# Patient Record
Sex: FEMALE | Race: WHITE | NOT HISPANIC OR LATINO | Employment: PART TIME | URBAN - METROPOLITAN AREA
[De-identification: names, ages, dates, MRNs, and addresses within clinical notes are randomized per-mention and may not be internally consistent; named-entity substitution may affect disease eponyms.]

---

## 2017-02-14 ENCOUNTER — TELEPHONE (OUTPATIENT)
Dept: FAMILY MEDICINE | Facility: CLINIC | Age: 52
End: 2017-02-14

## 2017-02-14 DIAGNOSIS — J01.01 ACUTE RECURRENT MAXILLARY SINUSITIS: Primary | ICD-10-CM

## 2017-02-14 NOTE — TELEPHONE ENCOUNTER
RN Sinusitis Treatment Protocol: ages 16 and up  Jennifer Hernandez, a 51 year old female, is having symptoms reviewed for possible sinus infection.    ASSESSMENT/PLAN:  1.  Antibiotic treatment is indicated as onset of symptoms have been more than 10 days.  Amoxicillin-clavulanate 875mg/125mg orally twice daily or 500mg/125mg three times daily for 5 to 7 days.  2.  Education: ibuprofen or acetaminophen as directed on the bottle, over the counter decongestant, Nasal saline rinse, (Neti-pot or a nasal saline spray is preferred to Afrin nasal spray), Afrin nasal spray no longer than 5 days.  3.  Follow-up: Schedule an appointment with a provider if symptoms do not improve after 3 days of antibiotics or if symptoms return after antibiotic therapy is complete.   4.  Patient verbalized understanding of this plan and is agreeable.    SUBJECTIVE:  Symptoms: Facial pain or pressure over the sinus areas, especially if worse with position change or cough, Nasal discharge/purulent, Nasal congestion and Change in sense of smell or lack of smell.     In addition notes: None   Shortness of breath: NO  Onset of symptoms was 12 day(s) ago.    Treatment measures tried include Fluids, OTC meds, Rest and Vaporizer.   Course of illness is worsening.  Predisposing conditions include: History of chronic sinusitis and Seasonal allergies    Complicating Factors and Serious Symptoms:   Patient reports: NONE   Patient denies: Fever > 102.5  Orbital pain  Periorbital swelling or erythema  Severe facial swelling or erythema  Severe neck pain  This being the worse headache the patient has ever experienced  Vision changes  COPD (chronic obstructive pulmonary disease)    ALLERGIES:  Allergies   Allergen Reactions     Doxycycline Swelling     Face and eye swelling     Hmg-Coa-R Inhibitors      Lipitor     Morphine Nausea and Vomiting     OBJECTIVE:  Weight: 0 lbs 0 oz    Encounter handled by: Nurse Triage.     Jenny Taylor RN

## 2017-02-14 NOTE — TELEPHONE ENCOUNTER
Reason for call:  Patient reporting a symptom    Symptom or request: sinus infection    Duration (how long have symptoms been present): 12 days    Have you been treated for this before? No    Additional comments: pt calling stating she has a sinus infection that won't go away. She has tried several otc products but they don't seem to be helping. She is wondering about getting a prescription called in.    Phone Number patient can be reached at:  Cell number on file:    Telephone Information:   Mobile [phone]       Best Time:  any    Can we leave a detailed message on this number:  YES    Call taken on 2/14/2017 at 9:30 AM by Michelle Fuller

## 2017-02-15 ENCOUNTER — OFFICE VISIT (OUTPATIENT)
Dept: FAMILY MEDICINE | Facility: CLINIC | Age: 52
End: 2017-02-15
Payer: COMMERCIAL

## 2017-02-15 VITALS
OXYGEN SATURATION: 96 % | BODY MASS INDEX: 20.24 KG/M2 | TEMPERATURE: 98.2 F | WEIGHT: 110 LBS | SYSTOLIC BLOOD PRESSURE: 125 MMHG | HEART RATE: 88 BPM | DIASTOLIC BLOOD PRESSURE: 60 MMHG | HEIGHT: 62 IN

## 2017-02-15 DIAGNOSIS — B37.31 CANDIDIASIS OF VAGINA: ICD-10-CM

## 2017-02-15 DIAGNOSIS — J01.90 ACUTE SINUSITIS WITH SYMPTOMS > 10 DAYS: Primary | ICD-10-CM

## 2017-02-15 PROCEDURE — 99213 OFFICE O/P EST LOW 20 MIN: CPT | Performed by: INTERNAL MEDICINE

## 2017-02-15 RX ORDER — FLUCONAZOLE 150 MG/1
150 TABLET ORAL ONCE
Qty: 1 TABLET | Refills: 0 | Status: SHIPPED | OUTPATIENT
Start: 2017-02-15 | End: 2017-02-15

## 2017-02-15 NOTE — PROGRESS NOTES
SUBJECTIVE:                                                    Jennifer Hernandez is a 51 year old female who presents to clinic today for the following health issues:  Chief Complaint   Patient presents with     Cough     x 12 days, on/off fever at home 100.1     Letter for School/Work         ENT Symptoms             Symptoms: cc Present Absent Comment   Fever/Chills   x On/off fever at home    Fatigue  x     Muscle Aches   x    Eye Irritation  x  Right eye swollen and red over the weekend- now resolved   Sneezing  x     Nasal Brian/Drg  x     Sinus Pressure/Pain x x  All sinuses   Loss of smell  x     Dental pain   x    Sore Throat   x    Swollen Glands  x     Ear Pain/Fullness  x  Right ear mostly    Cough x x  Dry    Wheeze   x    Chest Pain   x Fullness in chest, no pain    Shortness of breath   x    Rash   x    Other  x  Headache     Symptom duration:  12 days    Symptom severity:     Treatments tried:  OTC head/chest medication at the onset, Sinus medication now, Excedrin headache   Contacts:  mother w/ similar symptoms, w/ pneumonia       Jennifer called in to the phone line yesterday and was prescribed Augmentin for sinusitis, but hasn't picked it up yet.  She had to still come in to clinic today because she needs a doctor's note for work.  She takes Afrin chronically twice per day, has been unable to get off of it.  Always gets a vaginal yeast infection after antibiotics.     Problem list and histories reviewed & adjusted, as indicated.  Additional history: as documented    Current Outpatient Prescriptions   Medication Sig Dispense Refill     Multiple Vitamins-Minerals (MULTIVITAMIN ADULT PO)        fluconazole (DIFLUCAN) 150 MG tablet Take 1 tablet (150 mg) by mouth once for 1 dose 1 tablet 0     levothyroxine (SYNTHROID) 88 MCG tablet Take 1 tablet (88 mcg) by mouth daily 90 tablet 3     citalopram (CELEXA) 20 MG tablet Take 2 tablets (40 mg) by mouth daily (Needs follow-up appointment for this medication)  "180 tablet 3     amoxicillin-clavulanate (AUGMENTIN) 875-125 MG per tablet Take 1 tablet by mouth 2 times daily 14 tablet 0     Allergies   Allergen Reactions     Doxycycline Swelling     Face and eye swelling     Hmg-Coa-R Inhibitors      Lipitor     Morphine Nausea and Vomiting       ROS:  Constitutional, HEENT, pulmonary systems are negative, except as otherwise noted.    OBJECTIVE:                                                    /60 (BP Location: Right arm, Patient Position: Chair, Cuff Size: Adult Regular)  Pulse 88  Temp 98.2  F (36.8  C) (Tympanic)  Ht 5' 2.25\" (1.581 m)  Wt 110 lb (49.9 kg)  SpO2 96%  BMI 19.96 kg/m2  Body mass index is 19.96 kg/(m^2).    GENERAL: alert and no distress, fatigued  EYES: Eyes grossly normal to inspection, PERRL and conjunctivae and sclerae normal  HENT: ear canals and TMs normal, frontal and right maxillary sinuses are tender to percussion, nose and mouth without ulcers or lesions, oropharynx red but no tonsillar exudates  NECK: no adenopathy, no asymmetry, masses, or scars  RESP: normal effort, some crackles in right base  CV: regular rate and rhythm, normal S1 S2, no S3 or S4, no murmur, click or rub     Diagnostic Test Results:  none      ASSESSMENT/PLAN:                                                        1. Acute sinusitis with symptoms > 10 days    She was prescribed Augmentin via phone encounter yesterday; recommend that she proceed with this therapy.  She does have some abnormal lung sounds in the right base and has had fevers, so pneumonia is a possibility, but CXR not performed since we are starting antibiotics anyway.  She takes Afrin BID chronically and previously has had a lot of rebound when trying to stop.  Advised trying a cross taper with Flonase.    2. Candidiasis of vagina    She reports she always gets yeast infections after antibiotics, so prescription given for fluconazole to use after antibiotics if needed.     - fluconazole (DIFLUCAN) 150 MG " tablet; Take 1 tablet (150 mg) by mouth once for 1 dose  Dispense: 1 tablet; Refill: 0      Follow-up as needed.      Ian Cobb MD  [facility]

## 2017-02-15 NOTE — PATIENT INSTRUCTIONS
Try tapering off of the Afrin- reduce this down to once per day and also start Flonase once per day (at a different time of day) at the same time.  Continue this for a week, and then stop the Afrin.

## 2017-02-15 NOTE — LETTER
[facility]  [address] [facility] [address]  Phone: [phone]    February 15, 2017        Jennifer Hernandez  [address] [place] [address]          To whom it may concern:    RE: Jennifer Hernandez    Patient was seen and treated today at our clinic and missed work.  Patient may return to work Tuesday, Feb 21st, 2017.     Please contact me for questions or concerns.      Sincerely,        aIn Cobb MD

## 2017-02-15 NOTE — NURSING NOTE
"Chief Complaint   Patient presents with     Cough     x 12 days, on/off fever at home 100.1     Letter for School/Work       Initial /60 (BP Location: Right arm, Patient Position: Chair, Cuff Size: Adult Regular)  Pulse 88  Temp 98.2  F (36.8  C) (Tympanic)  Ht 5' 2.25\" (1.581 m)  Wt 110 lb (49.9 kg)  SpO2 96%  BMI 19.96 kg/m2 Estimated body mass index is 19.96 kg/(m^2) as calculated from the following:    Height as of this encounter: 5' 2.25\" (1.581 m).    Weight as of this encounter: 110 lb (49.9 kg).  Medication Reconciliation: complete   Hali DAVENPORT CMA ([facility])        "

## 2017-02-15 NOTE — MR AVS SNAPSHOT
After Visit Summary   2/15/2017    Jennifer Hernandez    MRN: [id number]           Patient Information     Date Of Birth          1965        Visit Information        Provider Department      2/15/2017 8:20 AM Ian Cobb MD [facility]        Today's Diagnoses     Acute sinusitis with symptoms > 10 days    -  1    Candidiasis of vagina          Care Instructions    Try tapering off of the Afrin- reduce this down to once per day and also start Flonase once per day (at a different time of day) at the same time.  Continue this for a week, and then stop the Afrin.          Follow-ups after your visit        Who to contact     If you have questions or need follow up information about today's clinic visit or your schedule please contact [facility] directly at [phone].  Normal or non-critical lab and imaging results will be communicated to you by MyChart, letter or phone within 4 business days after the clinic has received the results. If you do not hear from us within 7 days, please contact the clinic through [facility] or phone. If you have a critical or abnormal lab result, we will notify you by phone as soon as possible.  Submit refill requests through [employer] or call your pharmacy and they will forward the refill request to us. Please allow 3 business days for your refill to be completed.          Additional Information About Your Visit        MyChart Information     [employer] gives you secure access to your electronic health record. If you see a primary care provider, you can also send messages to your care team and make appointments. If you have questions, please call your primary care clinic.  If you do not have a primary care provider, please call [phone] and they will assist you.        Care EveryWhere ID     This is your Care EveryWhere ID. This could be used by other organizations to access your [facility] medical records  [id number]        Your Vitals Were      "Pulse Temperature Height Pulse Oximetry BMI (Body Mass Index)       88 98.2  F (36.8  C) (Tympanic) 5' 2.25\" (1.581 m) 96% 19.96 kg/m2        Blood Pressure from Last 3 Encounters:   02/15/17 125/60   11/21/16 104/80   02/09/16 124/68    Weight from Last 3 Encounters:   02/15/17 110 lb (49.9 kg)   11/21/16 110 lb (49.9 kg)   02/09/16 106 lb (48.1 kg)              Today, you had the following     No orders found for display         Today's Medication Changes          These changes are accurate as of: 2/15/17  8:49 AM.  If you have any questions, ask your nurse or doctor.               Start taking these medicines.        Dose/Directions    fluconazole 150 MG tablet   Commonly known as:  DIFLUCAN   Used for:  Candidiasis of vagina   Started by:  Ian Cobb MD        Dose:  150 mg   Take 1 tablet (150 mg) by mouth once for 1 dose   Quantity:  1 tablet   Refills:  0         Stop taking these medicines if you haven't already. Please contact your care team if you have questions.     amoxicillin 875 MG tablet   Commonly known as:  AMOXIL   Stopped by:  Ian Cobb MD                Where to get your medicines      These medications were sent to [facility] [address]   [address] [facility] [address]     Phone:  [phone]     fluconazole 150 MG tablet                Primary Care Provider Office Phone # Fax #    Calli Lashae Eng [phone]       [facility] [address] [facility] [address]        Thank you!     Thank you for choosing [facility]  for your care. Our goal is always to provide you with excellent care. Hearing back from our patients is one way we can continue to improve our services. Please take a few minutes to complete the written survey that you may receive in the mail after your visit with us. Thank you!             Your Updated Medication List - Protect others around you: Learn how to safely use, store and " throw away your medicines at www.disposemymeds.org.          This list is accurate as of: 2/15/17  8:49 AM.  Always use your most recent med list.                   Brand Name Dispense Instructions for use    amoxicillin-clavulanate 875-125 MG per tablet    AUGMENTIN    14 tablet    Take 1 tablet by mouth 2 times daily       citalopram 20 MG tablet    celeXA    180 tablet    Take 2 tablets (40 mg) by mouth daily (Needs follow-up appointment for this medication)       fluconazole 150 MG tablet    DIFLUCAN    1 tablet    Take 1 tablet (150 mg) by mouth once for 1 dose       levothyroxine 88 MCG tablet    SYNTHROID    90 tablet    Take 1 tablet (88 mcg) by mouth daily       MULTIVITAMIN ADULT PO

## 2017-02-18 ENCOUNTER — TELEPHONE (OUTPATIENT)
Dept: NURSING | Facility: CLINIC | Age: 52
End: 2017-02-18

## 2017-02-18 NOTE — TELEPHONE ENCOUNTER
"Call Type: Triage Call    Presenting Problem: Patient is calling due to not \"feeling better\"  after being on \"augmentin for a sinus infection.\" Patient states  that the provider informed her that she has pneumonia in her right  lung. Patient is requesting another treatment option. Triager unable  to locate note in chart regarding pneumonia or imaging. Triager  advised patient to be seen today in urgent care. Patient refused and  wanted to know if provider would be in on Monday. Triager called out  to  and patient hung up. Triaged for upper respiratory  infection/Disposition to call provider within 8 hours.  Triage Note:  Guideline Title: Upper Respiratory Infection (URI)  Recommended Disposition: Call Provider within 8 Hours  Original Inclination: Wanted to speak with a nurse  Override Disposition:  Intended Action: Call PCP/HCP  Physician Contacted: No  Being treated by a provider for a secondary infection AND no improvement in  symptoms, symptoms have worsened OR has new symptoms after following treatment  plan for the time specified by provider. ?  YES  Severe breathing problems ? NO  Breathing symptoms (wheezing, shortness of breath, changes in rates of breathing,  skin color changes) main problem ? NO  Sore throat is the symptom causing most concern ? NO  Cough is the symptom causing most concern ? NO  Sudden onset of flu-like symptoms ? NO  Severe headache unrelieved by 8 hours of nonprescription medication ? NO  Pregnant and has temperature 100F (37.7 C) or greater ? NO  Any temperature elevation in an immunocompromised individual OR frail elderly with  signs of dehydration ? NO  Physician Instructions:  Care Advice: Use a cool mist humidifier to moisten air.  Be sure to clean  according to 's instructions.  Continue to follow treatment plan, including medications, until evaluated  by provider.  Go to the ED if has new onset of stiff neck, vomiting, drowsiness or  confusion.  Consider use " of a saline nasal spray per package directions to help relieve  nasal congestion.  Drink more fluids -- water, low-sugar juices, tea and warm soup, especially  chicken broth, are options.  Avoid caffeinated or alcoholic beverages  because they can increase the chance of dehydration.  CAUTIONS  Go to ED IMMEDIATELY if developing increased shortness of breath,  continuous cough, worsening fatigue, or unable to perform ADLs.  SYMPTOM / CONDITION MANAGEMENT  Medication Advice: - Discontinue all nonprescription and alternative  medications, especially stimulants, until evaluated by provider. - Take  prescribed medications as directed, following label instructions for the  medication. - Do not change medications or dosing regimen until provider is  consulted. - Know possible side effects of medication and what to do if  they occur. - Tell provider all prescription, nonprescription or  alternative medications that you take  Respiratory Hygiene:   - Cover the nose/mouth tightly with a tissue when  coughing or sneezing.    - Use tissue 1 time and discard in the nearest  waste receptacle.    - Wash hands with soap and water or alcohol-based hand  rub after coming into contact with respiratory secretions and contaminated  objects/materials.    - Alternatively when no tissue is available, cough  into the bend of the elbow.  - Avoid touching your eyes, nose or mouth.  See your provider today if you have a temperature 101.5F (38.6C) or higher,  increased difficulty breathing, chest pain with cough, or cough with  blood-tinged sputum.  Total water intake includes drinking water, water in beverages, and water  contained in food. Fluids make up about 80% of the body's total hydration  need. Individual fluid requirement to maintain hydration vary based on  physical activity, environmental factors and illness. Limit fluids that  contain sugar, caffeine, or alcohol. Urine will be very light yellow color  when you drink enough fluids.

## 2017-02-20 ENCOUNTER — TELEPHONE (OUTPATIENT)
Dept: FAMILY MEDICINE | Facility: CLINIC | Age: 52
End: 2017-02-20

## 2017-02-20 DIAGNOSIS — J01.90 ACUTE SINUSITIS WITH SYMPTOMS > 10 DAYS: Primary | ICD-10-CM

## 2017-02-20 RX ORDER — CEFUROXIME AXETIL 250 MG/1
250 TABLET ORAL 2 TIMES DAILY
Qty: 20 TABLET | Refills: 0 | Status: SHIPPED | OUTPATIENT
Start: 2017-02-20 | End: 2017-07-23

## 2017-02-20 NOTE — TELEPHONE ENCOUNTER
S-(situation): Sinus symptoms persist.    B-(background): Patient was seen 2-15-17.  Given augmentin (not from OV - prescribed via phone note 2-14-17).  She has 2 tablets today and 2 tablets tomorrow remaining.  Patient reports her Rt ear pain has not gotten better - pain is worse since LOV.  She is still experiencing a lot of nasal drainage, HA, sinus pressure is the same since LOV.  Flonase is giving her minimal relief.  Her cough has been non-productive until this morning.  Her Rt eye has improved. Denies SOB, chest pain, fevers, or any other new/worsening symptoms.  Patient would like to know if her medication should be changed?  Go on a longer antibiotic treatment?    A-(assessment): sinus symptoms.    R-(recommendations): Please advise.  Pharm ready.    Routing to provider.  Melinda MCGHEE RN

## 2017-02-20 NOTE — TELEPHONE ENCOUNTER
Reason for Call:  Other sinus infection    Detailed comments: Patient was seen last Wednesday for a sinus infection.  She received Augmentin the day before and was told to start the med, however it is not working.    Phone Number Patient can be reached at: Home number on file [phone] (home)    Best Time: any    Can we leave a detailed message on this number? YES    Call taken on 2/20/2017 at 7:30 AM by Beth Bazzi

## 2017-02-20 NOTE — LETTER
[facility]  [address] [facility] [place] [address]  Phone: [phone]    February 20, 2017        Jennifer Hernandez  [address] [place] [id number]          To whom it may concern:    RE: Jennifer Hernandez    Patient was seen and treated at our clinic.  Due to her illness, she will be missing work 2/21-2/25/17.    Please contact me for questions or concerns.      Sincerely,        Ian Cobb MD

## 2017-02-20 NOTE — TELEPHONE ENCOUNTER
Pt was seen in clinic 2/15 and is requesting a note excusing her from work 2/21-2/25, please call pt [phone] Pt will pick this up

## 2017-07-23 ENCOUNTER — APPOINTMENT (OUTPATIENT)
Dept: CT IMAGING | Facility: CLINIC | Age: 52
End: 2017-07-23
Attending: FAMILY MEDICINE
Payer: COMMERCIAL

## 2017-07-23 ENCOUNTER — HOSPITAL ENCOUNTER (EMERGENCY)
Facility: CLINIC | Age: 52
Discharge: HOME | End: 2017-07-23
Attending: FAMILY MEDICINE | Admitting: FAMILY MEDICINE
Payer: COMMERCIAL

## 2017-07-23 ENCOUNTER — APPOINTMENT (OUTPATIENT)
Dept: GENERAL RADIOLOGY | Facility: CLINIC | Age: 52
End: 2017-07-23
Attending: FAMILY MEDICINE
Payer: COMMERCIAL

## 2017-07-23 VITALS
HEART RATE: 98 BPM | DIASTOLIC BLOOD PRESSURE: 80 MMHG | RESPIRATION RATE: 16 BRPM | TEMPERATURE: 97.6 F | BODY MASS INDEX: 20.32 KG/M2 | WEIGHT: 112 LBS | OXYGEN SATURATION: 100 % | SYSTOLIC BLOOD PRESSURE: 118 MMHG

## 2017-07-23 DIAGNOSIS — K29.00 ACUTE GASTRITIS WITHOUT HEMORRHAGE, UNSPECIFIED GASTRITIS TYPE: ICD-10-CM

## 2017-07-23 DIAGNOSIS — M54.50 ACUTE LEFT-SIDED LOW BACK PAIN WITHOUT SCIATICA: ICD-10-CM

## 2017-07-23 LAB
ALBUMIN SERPL-MCNC: 3.6 G/DL (ref 3.4–5)
ALBUMIN UR-MCNC: NEGATIVE MG/DL
ALP SERPL-CCNC: 53 U/L (ref 40–150)
ALT SERPL W P-5'-P-CCNC: 23 U/L (ref 0–50)
ANION GAP SERPL CALCULATED.3IONS-SCNC: 6 MMOL/L (ref 3–14)
APPEARANCE UR: CLEAR
AST SERPL W P-5'-P-CCNC: 20 U/L (ref 0–45)
BASOPHILS # BLD AUTO: 0 10E9/L (ref 0–0.2)
BASOPHILS NFR BLD AUTO: 0.3 %
BILIRUB SERPL-MCNC: 0.2 MG/DL (ref 0.2–1.3)
BILIRUB UR QL STRIP: NEGATIVE
BUN SERPL-MCNC: 27 MG/DL (ref 7–30)
CALCIUM SERPL-MCNC: 8.6 MG/DL (ref 8.5–10.1)
CHLORIDE SERPL-SCNC: 107 MMOL/L (ref 94–109)
CO2 SERPL-SCNC: 27 MMOL/L (ref 20–32)
COLOR UR AUTO: YELLOW
CREAT SERPL-MCNC: 0.63 MG/DL (ref 0.52–1.04)
DIFFERENTIAL METHOD BLD: NORMAL
EOSINOPHIL # BLD AUTO: 0.2 10E9/L (ref 0–0.7)
EOSINOPHIL NFR BLD AUTO: 2.4 %
ERYTHROCYTE [DISTWIDTH] IN BLOOD BY AUTOMATED COUNT: 12.1 % (ref 10–15)
GFR SERPL CREATININE-BSD FRML MDRD: NORMAL ML/MIN/1.7M2
GLUCOSE SERPL-MCNC: 98 MG/DL (ref 70–99)
GLUCOSE UR STRIP-MCNC: NEGATIVE MG/DL
HCT VFR BLD AUTO: 38.9 % (ref 35–47)
HGB BLD-MCNC: 13.2 G/DL (ref 11.7–15.7)
HGB UR QL STRIP: NEGATIVE
IMM GRANULOCYTES # BLD: 0 10E9/L (ref 0–0.4)
IMM GRANULOCYTES NFR BLD: 0 %
KETONES UR STRIP-MCNC: NEGATIVE MG/DL
LEUKOCYTE ESTERASE UR QL STRIP: NEGATIVE
LIPASE SERPL-CCNC: 115 U/L (ref 73–393)
LYMPHOCYTES # BLD AUTO: 1.6 10E9/L (ref 0.8–5.3)
LYMPHOCYTES NFR BLD AUTO: 26.3 %
MCH RBC QN AUTO: 30.5 PG (ref 26.5–33)
MCHC RBC AUTO-ENTMCNC: 33.9 G/DL (ref 31.5–36.5)
MCV RBC AUTO: 90 FL (ref 78–100)
MONOCYTES # BLD AUTO: 0.5 10E9/L (ref 0–1.3)
MONOCYTES NFR BLD AUTO: 8.6 %
MUCOUS THREADS #/AREA URNS LPF: PRESENT /LPF
NEUTROPHILS # BLD AUTO: 3.8 10E9/L (ref 1.6–8.3)
NEUTROPHILS NFR BLD AUTO: 62.4 %
NITRATE UR QL: NEGATIVE
PH UR STRIP: 5.5 PH (ref 5–7)
PLATELET # BLD AUTO: 281 10E9/L (ref 150–450)
POTASSIUM SERPL-SCNC: 4.1 MMOL/L (ref 3.4–5.3)
PROT SERPL-MCNC: 6.9 G/DL (ref 6.8–8.8)
RBC # BLD AUTO: 4.33 10E12/L (ref 3.8–5.2)
RBC #/AREA URNS AUTO: 1 /HPF (ref 0–2)
SODIUM SERPL-SCNC: 140 MMOL/L (ref 133–144)
SP GR UR STRIP: 1.02 (ref 1–1.03)
SQUAMOUS #/AREA URNS AUTO: 2 /HPF (ref 0–1)
URN SPEC COLLECT METH UR: ABNORMAL
UROBILINOGEN UR STRIP-MCNC: NORMAL MG/DL (ref 0–2)
WBC # BLD AUTO: 6.2 10E9/L (ref 4–11)
WBC #/AREA URNS AUTO: 1 /HPF (ref 0–2)

## 2017-07-23 PROCEDURE — 96374 THER/PROPH/DIAG INJ IV PUSH: CPT

## 2017-07-23 PROCEDURE — 25000132 ZZH RX MED GY IP 250 OP 250 PS 637: Performed by: FAMILY MEDICINE

## 2017-07-23 PROCEDURE — 96361 HYDRATE IV INFUSION ADD-ON: CPT

## 2017-07-23 PROCEDURE — 71020 XR CHEST 2 VW: CPT

## 2017-07-23 PROCEDURE — 99284 EMERGENCY DEPT VISIT MOD MDM: CPT | Performed by: FAMILY MEDICINE

## 2017-07-23 PROCEDURE — 25000128 H RX IP 250 OP 636: Performed by: FAMILY MEDICINE

## 2017-07-23 PROCEDURE — 85025 COMPLETE CBC W/AUTO DIFF WBC: CPT | Performed by: FAMILY MEDICINE

## 2017-07-23 PROCEDURE — 25000125 ZZHC RX 250: Performed by: FAMILY MEDICINE

## 2017-07-23 PROCEDURE — 83690 ASSAY OF LIPASE: CPT | Performed by: FAMILY MEDICINE

## 2017-07-23 PROCEDURE — 81001 URINALYSIS AUTO W/SCOPE: CPT | Performed by: FAMILY MEDICINE

## 2017-07-23 PROCEDURE — 99284 EMERGENCY DEPT VISIT MOD MDM: CPT | Mod: 25

## 2017-07-23 PROCEDURE — 80053 COMPREHEN METABOLIC PANEL: CPT | Performed by: FAMILY MEDICINE

## 2017-07-23 PROCEDURE — 74177 CT ABD & PELVIS W/CONTRAST: CPT

## 2017-07-23 RX ORDER — SODIUM CHLORIDE 9 MG/ML
1000 INJECTION, SOLUTION INTRAVENOUS CONTINUOUS
Status: DISCONTINUED | OUTPATIENT
Start: 2017-07-23 | End: 2017-07-23 | Stop reason: HOSPADM

## 2017-07-23 RX ORDER — IOPAMIDOL 755 MG/ML
55 INJECTION, SOLUTION INTRAVASCULAR ONCE
Status: COMPLETED | OUTPATIENT
Start: 2017-07-23 | End: 2017-07-23

## 2017-07-23 RX ORDER — LIDOCAINE 50 MG/G
1 PATCH TOPICAL
Status: DISCONTINUED | OUTPATIENT
Start: 2017-07-23 | End: 2017-07-23 | Stop reason: HOSPADM

## 2017-07-23 RX ORDER — SUCRALFATE 1 G/1
1 TABLET ORAL 4 TIMES DAILY
Qty: 20 TABLET | Refills: 1 | Status: SHIPPED | OUTPATIENT
Start: 2017-07-23

## 2017-07-23 RX ORDER — SUCRALFATE ORAL 1 G/10ML
1 SUSPENSION ORAL
Status: DISCONTINUED | OUTPATIENT
Start: 2017-07-23 | End: 2017-07-23 | Stop reason: HOSPADM

## 2017-07-23 RX ORDER — ONDANSETRON 2 MG/ML
4 INJECTION INTRAMUSCULAR; INTRAVENOUS EVERY 30 MIN PRN
Status: DISCONTINUED | OUTPATIENT
Start: 2017-07-23 | End: 2017-07-23 | Stop reason: HOSPADM

## 2017-07-23 RX ADMIN — OMEPRAZOLE 20 MG: 20 CAPSULE, DELAYED RELEASE ORAL at 08:41

## 2017-07-23 RX ADMIN — LIDOCAINE 1 PATCH: 50 PATCH TOPICAL at 08:43

## 2017-07-23 RX ADMIN — SODIUM CHLORIDE 54 ML: 9 INJECTION, SOLUTION INTRAVENOUS at 09:10

## 2017-07-23 RX ADMIN — ONDANSETRON 4 MG: 2 INJECTION INTRAMUSCULAR; INTRAVENOUS at 08:40

## 2017-07-23 RX ADMIN — SODIUM CHLORIDE 1000 ML: 9 INJECTION, SOLUTION INTRAVENOUS at 08:30

## 2017-07-23 RX ADMIN — IOPAMIDOL 55 ML: 755 INJECTION, SOLUTION INTRAVENOUS at 09:10

## 2017-07-23 RX ADMIN — SUCRALFATE 1 G: 1 SUSPENSION ORAL at 08:42

## 2017-07-23 ASSESSMENT — ENCOUNTER SYMPTOMS
PALPITATIONS: 0
ABDOMINAL PAIN: 1
FREQUENCY: 0
WHEEZING: 0
DYSURIA: 0
HEADACHES: 0
SORE THROAT: 0
CHILLS: 0
DIAPHORESIS: 0
CONSTIPATION: 0
SHORTNESS OF BREATH: 0
VOMITING: 0
COUGH: 0
FEVER: 0
NAUSEA: 0
SINUS PRESSURE: 0
DIARRHEA: 0
BLOOD IN STOOL: 0

## 2017-07-23 NOTE — ED AVS SNAPSHOT
[facility]    [address] [facility] [address]    Phone:  [phone]    Fax:  [phone]                                       Jennifer Hernandez   MRN: [id number]    Department:  [facility]   Date of Visit:  7/23/2017           After Visit Summary Signature Page     I have received my discharge instructions, and my questions have been answered. I have discussed any challenges I see with this plan with the nurse or doctor.    ..........................................................................................................................................  Patient/Patient Representative Signature      ..........................................................................................................................................  Patient Representative Print Name and Relationship to Patient    ..................................................               ................................................  Date                                            Time    ..........................................................................................................................................  Reviewed by Signature/Title    ...................................................              ..............................................  Date                                                            Time

## 2017-07-23 NOTE — LETTER
[facility] EMERGENCY DEPARTMENT  [address] [facility] [address]  [phone]    2017    Jennifer Hernandez  [address] [facility] [address]  [phone] (home)     : 1965      To Whom it may concern:    Jennifer Hernandez was seen in our Emergency Department today, 2017.  I expect her condition to improve over the next 3 days.  She may return to work/school when improved.    Sincerely,        Gumaro Beck

## 2017-07-23 NOTE — ED AVS SNAPSHOT
[facility] Emergency Department    [address] [facility] [address]    Phone:  [phone]    Fax:  [phone]                                       Jennifer Hernandez   MRN: [id number]    Department:  [facility] Emergency Department   Date of Visit:  7/23/2017           Patient Information     Date Of Birth          1965        Your diagnoses for this visit were:     Acute gastritis without hemorrhage, unspecified gastritis type avoid alcohl, tobacco, ibuprofen, caffeine.  Stay hydrated with >64 oz fluid per day.  use prilosec 20 mg orally daily. continue famotidine for the first week while starting prilosec.  consider sucralfate as needed.  return for vomiting blood or blood in stool.  follow-up in clinic.  consider h. pylori testing in clinic    Acute left-sided low back pain without sciatica maintain range of motion of the low back. remove lidocaine patch in 12 hours. may replace in 24 hours (for 12 hours) with 4% OTC lidocaine       You were seen by Gumaro Beck MD.      Follow-up Information     Follow up with Calli Eng MD In 1 week.    Specialty:  Family Practice    Contact information:    [address] [address]  [phone]          Follow up with [facility] Emergency [facility].    Specialty:  EMERGENCY MEDICINE    Why:  As needed, If symptoms worsen    Contact information:    [address] [place] [id number] [phone]    Additional information:    The medical center is located at   [address] [facility] ([address] in [place], [place] north   of [place]).        Discharge Instructions         ICD-10-CM    1. Acute gastritis without hemorrhage, unspecified gastritis type K29.00     avoid alcohl, tobacco, ibuprofen, caffeine.  Stay hydrated with >64 oz fluid per day.  use prilosec 20 mg orally daily. continue famotidine for the first week while starting prilosec.  consider sucralfate  as needed.  return for vomiting blood or blood in stool.  follow-up in clinic.  consider h. pylori testing in clinic   2. Acute left-sided low back pain without sciatica M54.5     maintain range of motion of the low back. remove lidocaine patch in 12 hours. may replace in 24 hours (for 12 hours) with 4% OTC lidocaine         Possible Causes of Low Back or Leg Pain    The symptoms in your back or leg may be due to pressure on a nerve. This pressure may be caused by a damaged disk or by abnormal bone growth. Either way, you may feel pain, burning, tingling, or numbness. If you have pressure on a nerve that connects to the sciatic nerve, pain may shoot down your leg.    Pressure from the disk  Constant wear and tear can weaken a disk over time and cause back pain. The disk can then be damaged by a sudden movement or injury. If its soft center begins to bulge, the disk may press on a nerve. Or the outside of the disk may tear, and the soft center may squeeze through and pinch a nerve.    Pressure from bone  As a disk wears out, the vertebrae right above and below the disk begin to touch. This can put pressure on a nerve. Often, abnormal bone (called bone spurs) grows where the vertebrae rub against each other. This can cause the foramen or the spinal canal to narrow (called stenosis) and press against a nerve.  Date Last Reviewed: 10/4/2015    [id number] [employer]. [address] [place] [address], [place] [address]. All rights reserved. This information is not intended as a substitute for professional medical care. Always follow your healthcare professional's instructions.          Gastritis (Adult)    Gastritis is inflammation and irritation of the stomach lining. It can be present for a short time (acute) or be long lasting (chronic). Gastritis is often caused by infection with bacteria called H pylori. More than a third of people in the [place] have this bacteria in their bodies. In many cases, H pylori causes no  problems or symptoms. In some people, though, the infection irritates the stomach lining and causes gastritis. Other causes of stomach irritation include drinking alcohol or taking pain-relieving medicines called NSAIDs (such as aspirin or ibuprofen).   Symptoms of gastritis can include:    Abdominal pain or bloating    Loss of appetite    Nausea or vomiting    Vomiting blood or having black stools    Feeling more tired than usual  An inflamed and irritated stomach lining is more likely to develop a sore called an ulcer. To help prevent this, gastritis should be treated.  Home care  If needed, medicines may be prescribed. If you have H pylori infection, treating it will likely relieve your symptoms. Other changes can help reduce stomach irritation and help it heal.    If you have been prescribed medicines for H pylori infection, take them as directed. Take all of the medicine until it is finished or your healthcare provider tells you to stop, even if you feel better.    Your healthcare provider may recommend avoiding NSAIDs. If you take daily aspirin for your heart or other medical reasons, do not stop without talking to your healthcare provider first.    Avoid drinking alcohol.    Stop smoking. Smoking can irritate the stomach and delay healing. As much as possible, stay away from second hand smoke.  Follow-up care  Follow up with your healthcare provider, or as advised by our staff. Testing may be needed to check for inflammation or an ulcer.  When to seek medical advice  Call your healthcare provider for any of the following:    Stomach pain that gets worse or moves to the lower right abdomen (appendix area)    Chest pain that appears or gets worse, or spreads to the back, neck, shoulder, or arm    Frequent vomiting (can t keep down liquids)    Blood in the stool or vomit (red or black in color)    Feeling weak or dizzy    Fever of 100.4 F (38 C) or higher, or as directed by your healthcare provider  Date Last  Reviewed: 6/22/2015 2000-2017 [employer]. [address] [place] [address], [place] [address]. All rights reserved. This information is not intended as a substitute for professional medical care. Always follow your healthcare professional's instructions.          24 Hour Appointment Hotline       To make an appointment at any [facility], call [phone] ([phone]). If you don't have a family doctor or clinic, we will help you find one. [facility] are conveniently located to serve the needs of you and your family.             Review of your medicines      START taking        Dose / Directions Last dose taken    sucralfate 1 GM tablet   Commonly known as:  CARAFATE   Dose:  1 g   Quantity:  20 tablet        Take 1 tablet (1 g) by mouth 4 times daily   Refills:  1          Our records show that you are taking the medicines listed below. If these are incorrect, please call your family doctor or clinic.        Dose / Directions Last dose taken    citalopram 20 MG tablet   Commonly known as:  celeXA   Dose:  40 mg   Quantity:  180 tablet        Take 2 tablets (40 mg) by mouth daily (Needs follow-up appointment for this medication)   Refills:  3        levothyroxine 88 MCG tablet   Commonly known as:  SYNTHROID   Dose:  88 mcg   Quantity:  90 tablet        Take 1 tablet (88 mcg) by mouth daily   Refills:  3        MULTIVITAMIN ADULT PO        Refills:  0                Prescriptions were sent or printed at these locations (1 Prescription)                   [id number] [place] [id number] [address] [address]    Telephone:  [phone]   Fax:  [phone]   Hours:  Closed 10-16-08 business to [facility]                E-Prescribed (1 of 1)         sucralfate (CARAFATE) 1 GM tablet                Procedures and tests performed during your visit     CBC with platelets differential    CT Abdomen Pelvis w Contrast    Chest XR,  PA & LAT     Comprehensive metabolic panel    Lipase    UA with Microscopic reflex to Culture      Orders Needing Specimen Collection     None      Pending Results     Date and Time Order Name Status Description    7/23/2017 0814 CT Abdomen Pelvis w Contrast Preliminary             Pending Culture Results     No orders found from 7/21/2017 to 7/24/2017.            Pending Results Instructions     If you had any lab results that were not finalized at the time of your Discharge, you can call the ED Lab Result RN at [phone]. You will be contacted by this team for any positive Lab results or changes in treatment. The nurses are available 7 days a week from 10A to 6:30P.  You can leave a message 24 hours per day and they will return your call.        Test Results From Your Hospital Stay        7/23/2017  9:12 AM      Component Results     Component Value Ref Range & Units Status    Color Urine Yellow  Final    Appearance Urine Clear  Final    Glucose Urine Negative NEG mg/dL Final    Bilirubin Urine Negative NEG Final    Ketones Urine Negative NEG mg/dL Final    Specific Gravity Urine 1.019 1.003 - 1.035 Final    Blood Urine Negative NEG Final    pH Urine 5.5 5.0 - 7.0 pH Final    Protein Albumin Urine Negative NEG mg/dL Final    Urobilinogen mg/dL Normal 0.0 - 2.0 mg/dL Final    Nitrite Urine Negative NEG Final    Leukocyte Esterase Urine Negative NEG Final    Source Midstream Urine  Final    WBC Urine 1 0 - 2 /HPF Final    RBC Urine 1 0 - 2 /HPF Final    Squamous Epithelial /HPF Urine 2 (H) 0 - 1 /HPF Final    Mucous Urine Present (A) NEG /LPF Final         7/23/2017  8:42 AM      Component Results     Component Value Ref Range & Units Status    WBC 6.2 4.0 - 11.0 10e9/L Final    RBC Count 4.33 3.8 - 5.2 10e12/L Final    Hemoglobin 13.2 11.7 - 15.7 g/dL Final    Hematocrit 38.9 35.0 - 47.0 % Final    MCV 90 78 - 100 fl Final    MCH 30.5 26.5 - 33.0 pg Final    MCHC 33.9 31.5 - 36.5 g/dL Final    RDW 12.1 10.0 - 15.0 % Final     Platelet Count 281 150 - 450 10e9/L Final    Diff Method Automated Method  Final    % Neutrophils 62.4 % Final    % Lymphocytes 26.3 % Final    % Monocytes 8.6 % Final    % Eosinophils 2.4 % Final    % Basophils 0.3 % Final    % Immature Granulocytes 0.0 % Final    Absolute Neutrophil 3.8 1.6 - 8.3 10e9/L Final    Absolute Lymphocytes 1.6 0.8 - 5.3 10e9/L Final    Absolute Monocytes 0.5 0.0 - 1.3 10e9/L Final    Absolute Eosinophils 0.2 0.0 - 0.7 10e9/L Final    Absolute Basophils 0.0 0.0 - 0.2 10e9/L Final    Abs Immature Granulocytes 0.0 0 - 0.4 10e9/L Final         7/23/2017  8:59 AM      Component Results     Component Value Ref Range & Units Status    Sodium 140 133 - 144 mmol/L Final    Potassium 4.1 3.4 - 5.3 mmol/L Final    Chloride 107 94 - 109 mmol/L Final    Carbon Dioxide 27 20 - 32 mmol/L Final    Anion Gap 6 3 - 14 mmol/L Final    Glucose 98 70 - 99 mg/dL Final    Urea Nitrogen 27 7 - 30 mg/dL Final    Creatinine 0.63 0.52 - 1.04 mg/dL Final    GFR Estimate >90  Non  GFR Calc   >60 mL/min/1.7m2 Final    GFR Estimate If Black >90   GFR Calc   >60 mL/min/1.7m2 Final    Calcium 8.6 8.5 - 10.1 mg/dL Final    Bilirubin Total 0.2 0.2 - 1.3 mg/dL Final    Albumin 3.6 3.4 - 5.0 g/dL Final    Protein Total 6.9 6.8 - 8.8 g/dL Final    Alkaline Phosphatase 53 40 - 150 U/L Final    ALT 23 0 - 50 U/L Final    AST 20 0 - 45 U/L Final         7/23/2017  8:57 AM      Component Results     Component Value Ref Range & Units Status    Lipase 115 73 - 393 U/L Final         7/23/2017  9:22 AM      Narrative     CT ABDOMEN/PELVIS WITH CONTRAST July 23, 2017 9:14 AM     HISTORY: Abdominal pain left lower quadrant, evaluate for  diverticulitis.    CONTRAST DOSE: 55 mL Isovue-370.    COMPARISON: 11/22/2011.    TECHNIQUE: Radiation dose for this scan was reduced using automated  exposure control, adjustment of the mA and/or kV according to patient  size, or iterative reconstruction  technique.    FINDINGS: The liver, spleen, kidneys, adrenal glands, pancreas, and  gallbladder appear within normal limits. There is no evidence of bowel  obstruction. No pericolonic inflammatory stranding is demonstrated.  There is no free peritoneal fluid or air. Pelvic contents appear  within normal limits.        Impression     IMPRESSION: No CT evidence of an acute inflammatory process in the  abdomen or pelvis. There is mild prominence of the sigmoid colonic  wall which is likely related to relative decompression. No pericolonic  inflammatory stranding is noted.         7/23/2017  9:28 AM      Narrative     CHEST TWO VIEWS  7/23/2017 9:20 AM     HISTORY: intrascapular pain    COMPARISON: None.        Impression     IMPRESSION: Normal.    MARLEE GAO MD                Thank you for choosing [facility]       Thank you for choosing [facility] for your care. Our goal is always to provide you with excellent care. Hearing back from our patients is one way we can continue to improve our services. Please take a few minutes to complete the written survey that you may receive in the mail after you visit with us. Thank you!        [employer] Information     [facility] gives you secure access to your electronic health record. If you see a primary care provider, you can also send messages to your care team and make appointments. If you have questions, please call your primary care clinic.  If you do not have a primary care provider, please call [phone] and they will assist you.        Care EveryWhere ID     This is your Care EveryWhere ID. This could be used by other organizations to access your [facility] medical records  [id number]        Equal Access to Services     MODESTO CERVANTES : Hadii sage Guadalupe, waaxda lualejandra, qaybta kaalmada chris, amandeep antoine . [facility] [phone].    ATENCIÓN: Si habla español, tiene a lai disposición servicios gratuitos de asistencia lingüística.  Uriel henderson [phone].    We comply with applicable federal civil rights laws and [place] laws. We do not discriminate on the basis of race, color, national origin, age, disability sex, sexual orientation or gender identity.            After Visit Summary       This is your record. Keep this with you and show to your community pharmacist(s) and doctor(s) at your next visit.

## 2017-07-23 NOTE — DISCHARGE INSTRUCTIONS
ICD-10-CM    1. Acute gastritis without hemorrhage, unspecified gastritis type K29.00     avoid alcohl, tobacco, ibuprofen, caffeine.  Stay hydrated with >64 oz fluid per day.  use prilosec 20 mg orally daily. continue famotidine for the first week while starting prilosec.  consider sucralfate as needed.  return for vomiting blood or blood in stool.  follow-up in clinic.  consider h. pylori testing in clinic   2. Acute left-sided low back pain without sciatica M54.5     maintain range of motion of the low back. remove lidocaine patch in 12 hours. may replace in 24 hours (for 12 hours) with 4% OTC lidocaine         Possible Causes of Low Back or Leg Pain    The symptoms in your back or leg may be due to pressure on a nerve. This pressure may be caused by a damaged disk or by abnormal bone growth. Either way, you may feel pain, burning, tingling, or numbness. If you have pressure on a nerve that connects to the sciatic nerve, pain may shoot down your leg.    Pressure from the disk  Constant wear and tear can weaken a disk over time and cause back pain. The disk can then be damaged by a sudden movement or injury. If its soft center begins to bulge, the disk may press on a nerve. Or the outside of the disk may tear, and the soft center may squeeze through and pinch a nerve.    Pressure from bone  As a disk wears out, the vertebrae right above and below the disk begin to touch. This can put pressure on a nerve. Often, abnormal bone (called bone spurs) grows where the vertebrae rub against each other. This can cause the foramen or the spinal canal to narrow (called stenosis) and press against a nerve.  Date Last Reviewed: 10/4/2015    [id number] [facility]. [address] [place] [address], [place] [address]. All rights reserved. This information is not intended as a substitute for professional medical care. Always follow your healthcare professional's instructions.          Gastritis (Adult)    Gastritis  is inflammation and irritation of the stomach lining. It can be present for a short time (acute) or be long lasting (chronic). Gastritis is often caused by infection with bacteria called H pylori. More than a third of people in the [place] have this bacteria in their bodies. In many cases, H pylori causes no problems or symptoms. In some people, though, the infection irritates the stomach lining and causes gastritis. Other causes of stomach irritation include drinking alcohol or taking pain-relieving medicines called NSAIDs (such as aspirin or ibuprofen).   Symptoms of gastritis can include:    Abdominal pain or bloating    Loss of appetite    Nausea or vomiting    Vomiting blood or having black stools    Feeling more tired than usual  An inflamed and irritated stomach lining is more likely to develop a sore called an ulcer. To help prevent this, gastritis should be treated.  Home care  If needed, medicines may be prescribed. If you have H pylori infection, treating it will likely relieve your symptoms. Other changes can help reduce stomach irritation and help it heal.    If you have been prescribed medicines for H pylori infection, take them as directed. Take all of the medicine until it is finished or your healthcare provider tells you to stop, even if you feel better.    Your healthcare provider may recommend avoiding NSAIDs. If you take daily aspirin for your heart or other medical reasons, do not stop without talking to your healthcare provider first.    Avoid drinking alcohol.    Stop smoking. Smoking can irritate the stomach and delay healing. As much as possible, stay away from second hand smoke.  Follow-up care  Follow up with your healthcare provider, or as advised by our staff. Testing may be needed to check for inflammation or an ulcer.  When to seek medical advice  Call your healthcare provider for any of the following:    Stomach pain that gets worse or moves to the lower right abdomen (appendix  area)    Chest pain that appears or gets worse, or spreads to the back, neck, shoulder, or arm    Frequent vomiting (can t keep down liquids)    Blood in the stool or vomit (red or black in color)    Feeling weak or dizzy    Fever of 100.4 F (38 C) or higher, or as directed by your healthcare provider  Date Last Reviewed: 6/22/2015 2000-2017 [employer]. [address] [place] [address], [place] [address]. All rights reserved. This information is not intended as a substitute for professional medical care. Always follow your healthcare professional's instructions.

## 2017-07-23 NOTE — ED PROVIDER NOTES
History     Chief Complaint   Patient presents with     Back Pain     upper back pain between shoulder blades, pain for 1 1/2 weeks, nausea for 1 1/2 weeks, no vomiting, no diarrhea, states having more frequent urination and BM's, low abd pain and cramping. No CP, No SOA,      Abdominal Pain     HPI  Jennifer Hernandez is a 51 year old female who presents with low intrascapular pain, left dorsi region pain onset approximately 1 week ago then with progressive epigastric pain it's burning and radiates into the left lower quadrant.  Symptoms have been most progressive over the last 24-48 hours and associated with nausea without vomiting.  No diarrhea or constipation.  2 normal stools this morning.  No dysuria urgency frequency or hematuria.  She has a history of peptic ulcer.  No history of diverticulitis.  History of ureterolithiasis.  Multiple abdominal surgeries in the past including appendectomy, hysterectomy, several C-sections.  She has been using famotidine twice in the last 24 hours with marginal effect.  She does not use regular NSAIDs.  She does use occasional alcohol and one cup caffeine per day.  Denies tobacco use.  She denies chest pain.  No associated shortness of breath.  No injury to the low back.  Denies recent prolonged travel (>3 hours) by car or plane, history or FHx of venous thromboembolism, recent surgery (last 4 weeks), active cancer history, hypercoagulable state, estrogen or other medications/conditions causing VTE or  new unilateral swelling or pain in the legs or calves.  She is driving herself home and would like to avoid opioids here.    I have reviewed the Medications, Allergies, Past Medical and Surgical History, and Social History in the Epic system.    Allergies:   Allergies   Allergen Reactions     Doxycycline Swelling     Face and eye swelling     Hmg-Coa-R Inhibitors      Lipitor     Morphine Nausea and Vomiting         No current facility-administered medications on file prior to  "encounter.   Current Outpatient Prescriptions on File Prior to Encounter:  cefUROXime (CEFTIN) 250 MG tablet Take 1 tablet (250 mg) by mouth 2 times daily   Multiple Vitamins-Minerals (MULTIVITAMIN ADULT PO)    levothyroxine (SYNTHROID) 88 MCG tablet Take 1 tablet (88 mcg) by mouth daily   citalopram (CELEXA) 20 MG tablet Take 2 tablets (40 mg) by mouth daily (Needs follow-up appointment for this medication)       Patient Active Problem List   Diagnosis     Hypothyroidism     Constipation     PSVT (paroxysmal supraventricular tachycardia) (H)     HYPERLIPIDEMIA LDL GOAL <160     Major depressive disorder, single episode, in remission (H)     Anxiety     Hypothyroidism, unspecified hypothyroidism type     Atrophic vaginitis     BV (bacterial vaginosis)     Slow transit constipation       Past Surgical History:   Procedure Laterality Date     C APPENDECTOMY       C/SECTION, LOW TRANSVERSE      , Low Transverse     C/SECTION, LOW TRANSVERSE      , Low Transverse     COLONOSCOPY  3/2/2010    Repeat 10 years     HC DRAINAGE FINGER ABSCESS, SIMPLE      right hand, hospital 3 days     HC REMOVE TONSILS/ADENOIDS,12+ Y/O  age 25     HYSTERECTOMY, VAGINAL       LAPAROSCOPY,VAG HYSTERECTOMY  , ,     ENDOMETRIOSIS, ADENOMYOSIS       Social History   Substance Use Topics     Smoking status: Never Smoker     Smokeless tobacco: Never Used     Alcohol use Yes      Comment: abou 4 drinks a week       Most Recent Immunizations   Administered Date(s) Administered     Influenza (IIV3) 2013     Influenza Vaccine IM 3yrs+ 4 Valent IIV4 2015     TD (ADULT, 7+) 1999     TDAP Vaccine (Adacel) 2011       BMI: Estimated body mass index is 20.32 kg/(m^2) as calculated from the following:    Height as of 2/15/17: 1.581 m (5' 2.25\").    Weight as of this encounter: 50.8 kg (112 lb).      Review of Systems   Constitutional: Negative for chills, diaphoresis and fever.   HENT: " Negative for ear pain, sinus pressure and sore throat.    Eyes: Negative for visual disturbance.   Respiratory: Negative for cough, shortness of breath and wheezing.    Cardiovascular: Negative for chest pain and palpitations.   Gastrointestinal: Positive for abdominal pain. Negative for blood in stool, constipation, diarrhea, nausea and vomiting.   Genitourinary: Negative for dysuria, frequency and urgency.   Skin: Negative for rash.   Neurological: Negative for headaches.   All other systems reviewed and are negative.      Physical Exam   BP: 118/80  Pulse: 98  Temp: 97.6  F (36.4  C)  Resp: 16  Weight: 50.8 kg (112 lb)  SpO2: 100 %  Physical Exam   Constitutional: She appears distressed.   HENT:   Mouth/Throat: Oropharynx is clear and moist.   Eyes: Conjunctivae are normal.   Neck: Neck supple.   Cardiovascular: Normal rate and regular rhythm.  Exam reveals no gallop and no friction rub.    No murmur heard.  Pulmonary/Chest: No respiratory distress. She has no wheezes. She has no rales.   Abdominal: Soft. Bowel sounds are normal. She exhibits no distension. There is tenderness in the left lower quadrant. There is no rebound and no guarding.   Musculoskeletal: She exhibits no edema.   Neurological: She is alert. She exhibits normal muscle tone.   Skin: No rash noted. She is not diaphoretic.       ED Course     ED Course     Procedures             Critical Care time:  none               Results for orders placed or performed during the hospital encounter of 07/23/17   CT Abdomen Pelvis w Contrast    Narrative    CT ABDOMEN/PELVIS WITH CONTRAST July 23, 2017 9:14 AM     HISTORY: Abdominal pain left lower quadrant, evaluate for  diverticulitis.    CONTRAST DOSE: 55 mL Isovue-370.    COMPARISON: 11/22/2011.    TECHNIQUE: Radiation dose for this scan was reduced using automated  exposure control, adjustment of the mA and/or kV according to patient  size, or iterative reconstruction technique.    FINDINGS: The liver,  spleen, kidneys, adrenal glands, pancreas, and  gallbladder appear within normal limits. There is no evidence of bowel  obstruction. No pericolonic inflammatory stranding is demonstrated.  There is no free peritoneal fluid or air. Pelvic contents appear  within normal limits.      Impression    IMPRESSION: No CT evidence of an acute inflammatory process in the  abdomen or pelvis. There is mild prominence of the sigmoid colonic  wall which is likely related to relative decompression. No pericolonic  inflammatory stranding is noted.    GARRETT SANTOS MD   Chest XR,  PA & LAT    Narrative    CHEST TWO VIEWS  7/23/2017 9:20 AM     HISTORY: intrascapular pain    COMPARISON: None.      Impression    IMPRESSION: Normal.    MARLEE GAO MD   UA with Microscopic reflex to Culture   Result Value Ref Range    Color Urine Yellow     Appearance Urine Clear     Glucose Urine Negative NEG mg/dL    Bilirubin Urine Negative NEG    Ketones Urine Negative NEG mg/dL    Specific Gravity Urine 1.019 1.003 - 1.035    Blood Urine Negative NEG    pH Urine 5.5 5.0 - 7.0 pH    Protein Albumin Urine Negative NEG mg/dL    Urobilinogen mg/dL Normal 0.0 - 2.0 mg/dL    Nitrite Urine Negative NEG    Leukocyte Esterase Urine Negative NEG    Source Midstream Urine     WBC Urine 1 0 - 2 /HPF    RBC Urine 1 0 - 2 /HPF    Squamous Epithelial /HPF Urine 2 (H) 0 - 1 /HPF    Mucous Urine Present (A) NEG /LPF   CBC with platelets differential   Result Value Ref Range    WBC 6.2 4.0 - 11.0 10e9/L    RBC Count 4.33 3.8 - 5.2 10e12/L    Hemoglobin 13.2 11.7 - 15.7 g/dL    Hematocrit 38.9 35.0 - 47.0 %    MCV 90 78 - 100 fl    MCH 30.5 26.5 - 33.0 pg    MCHC 33.9 31.5 - 36.5 g/dL    RDW 12.1 10.0 - 15.0 %    Platelet Count 281 150 - 450 10e9/L    Diff Method Automated Method     % Neutrophils 62.4 %    % Lymphocytes 26.3 %    % Monocytes 8.6 %    % Eosinophils 2.4 %    % Basophils 0.3 %    % Immature Granulocytes 0.0 %    Absolute Neutrophil 3.8 1.6 - 8.3  10e9/L    Absolute Lymphocytes 1.6 0.8 - 5.3 10e9/L    Absolute Monocytes 0.5 0.0 - 1.3 10e9/L    Absolute Eosinophils 0.2 0.0 - 0.7 10e9/L    Absolute Basophils 0.0 0.0 - 0.2 10e9/L    Abs Immature Granulocytes 0.0 0 - 0.4 10e9/L   Comprehensive metabolic panel   Result Value Ref Range    Sodium 140 133 - 144 mmol/L    Potassium 4.1 3.4 - 5.3 mmol/L    Chloride 107 94 - 109 mmol/L    Carbon Dioxide 27 20 - 32 mmol/L    Anion Gap 6 3 - 14 mmol/L    Glucose 98 70 - 99 mg/dL    Urea Nitrogen 27 7 - 30 mg/dL    Creatinine 0.63 0.52 - 1.04 mg/dL    GFR Estimate >90  Non  GFR Calc   >60 mL/min/1.7m2    GFR Estimate If Black >90   GFR Calc   >60 mL/min/1.7m2    Calcium 8.6 8.5 - 10.1 mg/dL    Bilirubin Total 0.2 0.2 - 1.3 mg/dL    Albumin 3.6 3.4 - 5.0 g/dL    Protein Total 6.9 6.8 - 8.8 g/dL    Alkaline Phosphatase 53 40 - 150 U/L    ALT 23 0 - 50 U/L    AST 20 0 - 45 U/L   Lipase   Result Value Ref Range    Lipase 115 73 - 393 U/L         Assessments & Plan (with Medical Decision Making)     MDM: Jennifer Hernandez is a 51 year old female presented with multiple concerns. She had  lat dorsi, flank, LLQ pain and epigastric abdominal pain.  There was tender to palpation both in the epigastrium and in the left lower quadrant. Differential diagnosis was brought including diverticulitis, gastritis, ureterolithiasis. There is also some findings suggestive of separate diagnoses such as lat dorsi spasm with focal .ttp and not flank.  We discussed imaging and she wished to pursue this. Labs are also reassuring. CT imaging demonstrated no acute findings.    I suspect her primary symptoms are the gastritis for which I made recommendations as below, As well as the lumbar back pain recommendations.  She has given precautions for return.    I have reviewed the nursing notes.    I have reviewed the findings, diagnosis, plan and need for follow up with the patient.       New Prescriptions    No medications  on file       Final diagnoses:   Acute gastritis without hemorrhage, unspecified gastritis type - avoid alcohl, tobacco, ibuprofen, caffeine.  Stay hydrated with >64 oz fluid per day.  use prilosec 20 mg orally daily. continue famotidine for the first week while starting prilosec.  consider sucralfate as needed.  return for vomiting blood or blood in stool.  follow-up in clinic.  consider h. pylori testing in clinic   Acute left-sided low back pain without sciatica - maintain range of motion of the low back. remove lidocaine patch in 12 hours. may replace in 24 hours (for 12 hours) with 4% OTC lidocaine       7/23/2017   [facility] EMERGENCY DEPARTMENT     Gumaro Beck MD  07/23/17 1586

## 2017-08-26 ENCOUNTER — HEALTH MAINTENANCE LETTER (OUTPATIENT)
Age: 52
End: 2017-08-26

## 2019-11-03 ENCOUNTER — HEALTH MAINTENANCE LETTER (OUTPATIENT)
Age: 54
End: 2019-11-03

## 2020-11-16 ENCOUNTER — HEALTH MAINTENANCE LETTER (OUTPATIENT)
Age: 55
End: 2020-11-16

## 2021-09-12 ENCOUNTER — HEALTH MAINTENANCE LETTER (OUTPATIENT)
Age: 56
End: 2021-09-12

## 2022-01-02 ENCOUNTER — HEALTH MAINTENANCE LETTER (OUTPATIENT)
Age: 57
End: 2022-01-02

## 2022-11-19 ENCOUNTER — HEALTH MAINTENANCE LETTER (OUTPATIENT)
Age: 57
End: 2022-11-19

## 2023-04-09 ENCOUNTER — HEALTH MAINTENANCE LETTER (OUTPATIENT)
Age: 58
End: 2023-04-09

## 2024-02-01 ENCOUNTER — OFFICE VISIT (OUTPATIENT)
Dept: URGENT CARE | Facility: URGENT CARE | Age: 59
End: 2024-02-01
Payer: COMMERCIAL

## 2024-02-01 VITALS
HEART RATE: 85 BPM | OXYGEN SATURATION: 98 % | TEMPERATURE: 98.5 F | DIASTOLIC BLOOD PRESSURE: 80 MMHG | SYSTOLIC BLOOD PRESSURE: 126 MMHG

## 2024-02-01 DIAGNOSIS — J01.91 ACUTE RECURRENT SINUSITIS, UNSPECIFIED LOCATION: Primary | ICD-10-CM

## 2024-02-01 LAB
BASOPHILS # BLD AUTO: 0 10E3/UL (ref 0–0.2)
BASOPHILS NFR BLD AUTO: 1 %
EOSINOPHIL # BLD AUTO: 0 10E3/UL (ref 0–0.7)
EOSINOPHIL NFR BLD AUTO: 1 %
ERYTHROCYTE [DISTWIDTH] IN BLOOD BY AUTOMATED COUNT: 12.7 % (ref 10–15)
HCT VFR BLD AUTO: 35.7 % (ref 35–47)
HGB BLD-MCNC: 12 G/DL (ref 11.7–15.7)
IMM GRANULOCYTES # BLD: 0 10E3/UL
IMM GRANULOCYTES NFR BLD: 0 %
LYMPHOCYTES # BLD AUTO: 1.6 10E3/UL (ref 0.8–5.3)
LYMPHOCYTES NFR BLD AUTO: 26 %
MCH RBC QN AUTO: 31.4 PG (ref 26.5–33)
MCHC RBC AUTO-ENTMCNC: 33.6 G/DL (ref 31.5–36.5)
MCV RBC AUTO: 94 FL (ref 78–100)
MONOCYTES # BLD AUTO: 0.6 10E3/UL (ref 0–1.3)
MONOCYTES NFR BLD AUTO: 10 %
NEUTROPHILS # BLD AUTO: 3.8 10E3/UL (ref 1.6–8.3)
NEUTROPHILS NFR BLD AUTO: 63 %
PLATELET # BLD AUTO: 253 10E3/UL (ref 150–450)
RBC # BLD AUTO: 3.82 10E6/UL (ref 3.8–5.2)
WBC # BLD AUTO: 6 10E3/UL (ref 4–11)

## 2024-02-01 PROCEDURE — 85025 COMPLETE CBC W/AUTO DIFF WBC: CPT

## 2024-02-01 PROCEDURE — 99204 OFFICE O/P NEW MOD 45 MIN: CPT

## 2024-02-01 PROCEDURE — 36415 COLL VENOUS BLD VENIPUNCTURE: CPT

## 2024-02-01 RX ORDER — ALBUTEROL SULFATE 90 UG/1
2 AEROSOL, METERED RESPIRATORY (INHALATION)
COMMUNITY
Start: 2023-11-06

## 2024-02-01 RX ORDER — VALACYCLOVIR HYDROCHLORIDE 1 G/1
TABLET, FILM COATED ORAL
COMMUNITY
Start: 2023-09-25

## 2024-02-01 RX ORDER — ROSUVASTATIN CALCIUM 20 MG/1
1 TABLET, COATED ORAL DAILY
COMMUNITY
Start: 2023-02-21

## 2024-02-01 RX ORDER — TRAZODONE HYDROCHLORIDE 50 MG/1
TABLET, FILM COATED ORAL
COMMUNITY
Start: 2023-11-06

## 2024-02-01 NOTE — LETTER
February 1, 2024      Jennifer Hernandez  [address] [id number]        To Whom It May Concern:    Jennifer Hernandez was seen in our clinic. She may return to work on Monday 2/5/24      Sincerely,      AZIZA Garcia CNP

## 2024-02-02 NOTE — PROGRESS NOTES
URGENT CARE  Assessment & Plan   Assessment:   Jennifer Hernandez is a 58 year old female who's clinical presentation today is consistent with:   1. Acute recurrent sinusitis, unspecified location  - CBC with platelets and differential;  - amoxicillin-clavulanate (AUGMENTIN) 875-125 MG tablet;   Plan:  Will treat patient with supportive and symptomatic measures for sinusitis at this time which include: Fluids, rest, over-the-counter medications: decongestants, antihistamines,  and expectorants, side effects of medications reviewed.   I discussed with patient that I suspect her symptoms are related to a viral or allergic sinusitis, however she states she would like to start antibiotics now as that is what has worked for her in the past, patient was insistent on antibiotics management and attempted medication seeking behavior, patient was fixated on antibiotics as the only treatment that will help her and was not receptive to other modalities, thus prescription sent, side effects of medications reviewed. Educated her if no improvement on the antibiotics to consider alternative sinusitis such as viral or allergic and other treatment modalities, stronger antibiotics would not be appropriate   Additionally we discussed if  if symptoms worsen) to follow up in 5-10 days.    No alarm signs or symptoms present   Differential Diagnoses for this patient's chief complaint that I considered include:  Polyps, turbinate hypertrophy, Chronic sinusitis, adenoiditis, allergic rhinitis, ABRS, viral, or fungal etiology, Tumors/malignancy, foreign body      Patient  is} agreeable to treatment plan and state they will follow-up if symptoms do not improve and/or if symptoms worsen   see patient's AVS 'monitor for' section for specific patient instructions given and discussed regarding what to watch for and when to follow up    AZIZA Garcia [facility]  [facility]      ______________________________________________________________________      Subjective     HPI: Jennifer Hernandez  is a 58 year old  female who presents today for evaluation the following concerns:   Patient endorses allergy and cold symptoms today which started a few days ago    Patient reports sinus drainage, runny nose, facial pressure    Patient states she gets sinus infections and would like antibiotics as that is what has helped her in the past, patient states she has tried ibuprofen and tylenol without any improvement   Patient denies any fevers} sweats, chills, shortness of breath, difficulty breathing,  weakness or signs of dehydration   Patient does endorse a cough and chest congestion         Review of Systems:  Pertinent review of systems as reflected in HPI, otherwise negative.     Objective    Physical Exam:  Vitals:    02/01/24 1854   BP: 126/80   Pulse: 85   Temp: 98.5  F (36.9  C)   TempSrc: Tympanic   SpO2: 98%      General: Alert and oriented, no acute distress, Vital signs reviewed: afebrile,  normotensive   Psy/mental status: Cooperative, nonanxious  SKIN: Intact, no rashes  EYES: EOMs intact, PERRLA bilaterally   Conjunctiva: Clear bilaterally, no injection or erythema present  EARS: TMs intact, translucent gray in color with normal landmarks present no erythema  or bulging tympanic membrane   Canals are without swelling, however have a mild amount of cerumen, no impaction  NOSE:  mucosa  erythematous bilaterally with a mild amount of rhinorrhea, clear  discharge  MOUTH/THROAT: lips, tongue, & oral mucosa appear normal upon inspection                Posterior oropharynx is erythematous but without exudate, lesions or tonsillar  Edema, no dysphonia, no unilateral tonsillar edema, no uvular deviation,   no signs of peritonsillar abscess  NECK: supple, has full range of motion with no meningeal signs              No lymphadenopathy present  LUNG: normal work of breathing, good respiratory  effort without retractions, good air  movement, non labored, inspection reveals normal chest expansion w/  inspiration            Lung sounds are clear to auscultation bilaterally,            No rales/rhonic/crackles wheezing noted           No cough noted     LABS:   Results for orders placed or performed in visit on 02/01/24   CBC with platelets and differential     Status: None   Result Value Ref Range    WBC Count 6.0 4.0 - 11.0 10e3/uL    RBC Count 3.82 3.80 - 5.20 10e6/uL    Hemoglobin 12.0 11.7 - 15.7 g/dL    Hematocrit 35.7 35.0 - 47.0 %    MCV 94 78 - 100 fL    MCH 31.4 26.5 - 33.0 pg    MCHC 33.6 31.5 - 36.5 g/dL    RDW 12.7 10.0 - 15.0 %    Platelet Count 253 150 - 450 10e3/uL    % Neutrophils 63 %    % Lymphocytes 26 %    % Monocytes 10 %    % Eosinophils 1 %    % Basophils 1 %    % Immature Granulocytes 0 %    Absolute Neutrophils 3.8 1.6 - 8.3 10e3/uL    Absolute Lymphocytes 1.6 0.8 - 5.3 10e3/uL    Absolute Monocytes 0.6 0.0 - 1.3 10e3/uL    Absolute Eosinophils 0.0 0.0 - 0.7 10e3/uL    Absolute Basophils 0.0 0.0 - 0.2 10e3/uL    Absolute Immature Granulocytes 0.0 <=0.4 10e3/uL   CBC with platelets and differential     Status: None    Narrative    The following orders were created for panel order CBC with platelets and differential.  Procedure                               Abnormality         Status                     ---------                               -----------         ------                     CBC with platelets and d...[[id number]]                      Final result                 Please view results for these tests on the individual orders.        ______________________________________________________________________    I explained my diagnostic considerations and recommendations to the patient, who voiced understanding and agreement with the treatment plan.   All questions were answered.   We discussed potential side effects, risks and benefits of any prescribed or recommended therapies,  as well as expectations for response to treatments.  Please see AVS for any patient instructions & handouts given.   Patient was advised to contact the Nurse Care Line, their Primary Care provider, Urgent Care, or the Emergency Department if there are new or worsening symptoms, or call 911 for emergencies.

## 2024-02-02 NOTE — PATIENT INSTRUCTIONS
Diagnosis:   Sinusitis- sinus pain (non bacterial cause) Viral sinusitis   The majority of sinusitis is not caused by bacteria and thus antibiotics are not indicated.   Green/yellow or thick mucus does not mean you have a bacterial sinus infection   Antibiotics can have anti-inflammatory effects and thus may decrease symptoms slightly but the side effects outweigh this risk.   For bacterial sinus infections, Azithromycin (Z-Pack) is not a first line nor a second line medication for this in part because of bacterial resistance caused in part by over-prescribing    Today   Labs: no signs of bacteria that is causing sinus pain = viral sinusitis vs allergic rhinosinutis     Augmentin (amoxicillin and clavulanate)    Common Side effects:    Diarrhea, skin rash (can be delayed or immediate), nausea and vomiting     Vaginitis, yeast infections     Risks:    C.diff, colon infection (considered a moderate risk)   People who are at risk:     Anyone w/ antibiotic exposure, older adults, immunocompromised   If on any GERD or PUD medications   Acute liver injury : abdominal pain, yellowing of the skin     Patients at higher risk: male patients, older patients    Any underlying liver disease, repeated courses or exposure to medications   Hypersensitivity - immediate or delayed     Hives, angioedema or anaphylaxis, red body rash                  Plan:   decongestants and antihistamines   Pseudoephedrine/ sudafed - behind the counter pharmacy - take while congested   Cetazine / loratadine   take daily   Benadryl - help w/ sleeping and breathing while sleeping   Nasal Sprays: When using a nasal spray insert the application into the nostril while  looking downward to the floor.   Remember NOSE toward TOES. This will help ensure the medication stays  where it is supposed to be and it will work better.   Nasal Saline may be used to help alleviate symptoms  Fluticasone (e.g. Flonase, Nasacort, etc) nasal spray is a nasal steroid that  works to decrease inflammation, swelling, pain, and drainage.   Use this after using nasal saline. Use 2 sprays per nostril ONE time a day for SEVEN days.   Then decrease to 1 spray per nostril ONE time a day as needed.  Oxymetazoline (Afrin): May use ONE spray per nostril 2 times a day as needed.   DO NOT USE FOR MORE THAN 3 DAYS. Doing so will result in worsening symptoms.   Wait at least 15 minutes between Afrin and Flonase.   Use nasal rinses  Neti pots - highly effective   Ensure humidity in your living space -   Especially during the winter months when it is notably more dry.   This can be done with a humidifier.   OTC pain relievers for headache and sinus pressure   Acetaminophen (Tylenol) -or/and- Ibuprofen (advil, motrin)   Stay Hydrated - drink fluids throughout the day.        Monitor for:   Stiff neck  Abnormal lethargy or confusion  Swelling of your eyelids  Vision problems, such as blurred or double vision  Fever of 101  F or higherSymptoms that don't go away in 10 days  Seizure  Trouble breathing  Feeling dizzy or faint  Fingernails, skin, or lips look blue, purple, or gray            Understanding viral  sinusitis  Viral sinusitis is when the lining of the inside of the nose and the sinuses becomes irritated and swollen. It is also called sinusitis, or a sinus infection.  Sinuses are air-filled spaces in the skull behind the face. They are kept moist and clean by a lining of mucosa. Things such as pollen, smoke, and chemical fumes can irritate the mucosa. It can then swell up. As a response to irritation, the mucosa makes more mucus and other fluids. Tiny hairlike cilia cover the mucosa. Cilia help carry mucus toward the opening of the sinus. Too much mucus may cause the cilia to stop working. This blocks the sinus opening. A buildup of fluid in the sinuses then causes pain and pressure. It can also cause bacteria to grow in the sinuses.    Symptoms   Symptoms often last around 7 to 10 days.   They  may also get better but then worsen. You may have:  Face pain or pressure under the eyes and around the nose  Headache  Fluid draining in the back of the throat (postnasal drip)  Congestion  Drainage that is thick and colored (often green), instead of clear  Cough  Problems with your sense of smell  Ear pain or hearing problems  Fever  Tooth pain  Fatigue    Diagnosing   Your healthcare provider will ask about your symptoms and past health.   He or she will look at your ears, nose, throat, and sinuses.   Often labs are needed to determine if the sinusitis is caused by a virus or bacterium     Treating acute rhinosinusitis  Most sinus infections will go away within 10 days. Your body will fight off the virus.  The bacterial kind need antiboitics but these are rare

## 2024-04-07 ENCOUNTER — HEALTH MAINTENANCE LETTER (OUTPATIENT)
Age: 59
End: 2024-04-07

## 2025-04-19 ENCOUNTER — HEALTH MAINTENANCE LETTER (OUTPATIENT)
Age: 60
End: 2025-04-19